# Patient Record
Sex: MALE | Race: OTHER
[De-identification: names, ages, dates, MRNs, and addresses within clinical notes are randomized per-mention and may not be internally consistent; named-entity substitution may affect disease eponyms.]

---

## 2018-11-03 ENCOUNTER — HOSPITAL ENCOUNTER (EMERGENCY)
Dept: HOSPITAL 36 - ER | Age: 48
Discharge: LEFT BEFORE DISCHARGE | End: 2018-11-03
Payer: MEDICAID

## 2018-11-03 DIAGNOSIS — E86.0: ICD-10-CM

## 2018-11-03 DIAGNOSIS — R79.89: ICD-10-CM

## 2018-11-03 DIAGNOSIS — K59.00: Primary | ICD-10-CM

## 2018-11-03 DIAGNOSIS — E87.8: ICD-10-CM

## 2018-11-03 LAB
ALBUMIN SERPL-MCNC: 4 GM/DL (ref 4.2–5.5)
ALBUMIN/GLOB SERPL: 1.1 {RATIO} (ref 1–1.8)
ALP SERPL-CCNC: 101 U/L (ref 34–104)
ALT SERPL-CCNC: 121 U/L (ref 7–52)
ANION GAP SERPL CALC-SCNC: 13.6 MMOL/L (ref 7–16)
AST SERPL-CCNC: 127 U/L (ref 13–39)
BASOPHILS # BLD AUTO: 0 TH/CUMM (ref 0–0.2)
BASOPHILS NFR BLD AUTO: 0.5 % (ref 0–2)
BILIRUB SERPL-MCNC: 1.4 MG/DL (ref 0.3–1)
BUN SERPL-MCNC: 6 MG/DL (ref 7–25)
CALCIUM SERPL-MCNC: 9.5 MG/DL (ref 8.6–10.3)
CHLORIDE SERPL-SCNC: 92 MEQ/L (ref 98–107)
CO2 SERPL-SCNC: 20.5 MEQ/L (ref 21–31)
CREAT SERPL-MCNC: 0.5 MG/DL (ref 0.7–1.3)
EOSINOPHIL # BLD AUTO: 0.1 TH/CMM (ref 0.1–0.4)
EOSINOPHIL NFR BLD AUTO: 0.8 % (ref 0–5)
ERYTHROCYTE [DISTWIDTH] IN BLOOD BY AUTOMATED COUNT: 13.2 % (ref 11.5–20)
GLOBULIN SER-MCNC: 3.6 GM/DL
GLUCOSE SERPL-MCNC: 127 MG/DL (ref 70–105)
HCT VFR BLD CALC: 46.6 % (ref 41–60)
HGB BLD-MCNC: 16.3 GM/DL (ref 12–16)
INR PPP: 1.04 (ref 0.5–1.4)
LYMPHOCYTE AB SER FC-ACNC: 1.1 TH/CMM (ref 1.5–3)
LYMPHOCYTES NFR BLD AUTO: 12.2 % (ref 20–50)
MCH RBC QN AUTO: 32.7 PG (ref 26–30)
MCHC RBC AUTO-ENTMCNC: 34.9 PG (ref 28–36)
MCV RBC AUTO: 93.7 FL (ref 80–99)
MONOCYTES # BLD AUTO: 0.8 TH/CMM (ref 0.3–1)
MONOCYTES NFR BLD AUTO: 8.5 % (ref 2–10)
NEUTROPHILS # BLD: 7.2 TH/CMM (ref 1.8–8)
NEUTROPHILS NFR BLD AUTO: 78 % (ref 40–80)
PLATELET # BLD: 190 TH/CMM (ref 150–400)
PMV BLD AUTO: 9.9 FL
POTASSIUM SERPL-SCNC: 3.1 MEQ/L (ref 3.5–5.1)
PROTHROMBIN TIME: 10.8 SECONDS (ref 9.5–11.5)
RBC # BLD AUTO: 4.98 MIL/CMM (ref 4.3–5.7)
SODIUM SERPL-SCNC: 123 MEQ/L (ref 136–145)
WBC # BLD AUTO: 9.2 TH/CMM (ref 4.8–10.8)

## 2018-11-03 NOTE — ED PHYSICIAN CHART
ED Chief Complaint/HPI





- Patient Information


Date Seen:: 11/03/18


Time Seen:: 14:07


Chief Complaint:: constipation and trauma


History of Present Illness:: 





this is a 48 yr old male who drink alcohol daily and considered an alcohol.  he 

is now concerned about his swollen abdomen and not being able to have a bowel 

movements since last night.


Allergies:: 


 Allergies











Allergy/AdvReac Type Severity Reaction Status Date / Time


 


No Known Allergies Allergy   Verified 11/03/18 14:02











Vitals:: 


 Vital Signs - 8 hr











  11/03/18





  14:03


 


Temp 98.8 F


 





 


RR 18


 


/104


 


O2 Sat % 98











Historian:: Patient


Review:: Nurse's Note Reviewed





ED Review of Systems





- Review of Systems


General/Constitutional: No fever, No chills, No weight loss, No weakness, No 

diaphoresis, No edema, No loss of appetite


Skin: No skin lesions, No rash, No bruising


Head: No headache, No light-headedness


Eyes: No loss of vision, No pain, No diplopia


ENT: No earache, No nasal drainage, No sore throat, No tinnitus


Neck: No neck pain, No swelling, No thyromegaly, No stiffness, No mass noted


Cardio Vascular: No chest pain, No palpitations, No PND, No orthopnea, No edema


Pulmonary: No SOB, No cough, No sputum, No wheezing


GI: No nausea, No vomiting, No diarrhea, Pain, No melena, No hematochezia, 

Constipation, No hematemesis


G/U: No dysuria, No frequency, No hematuria


Musculoskeletal: No bone or joint pain, No back pain, No muscle pain


Endocrine: No polyuria, No polydipsia


Psychiatric: No prior psych history, No depression, No anxiety, No suicidal 

ideation


Hematopoietic: No bruising, No lymphadenopathy


Allergic/Immuno: No urticaria, No angioedema


Neurological: No syncope, No focal symptoms, No weakness, No paresthesia, No 

headache, No seizure, No dizziness, No confusion, No vertigo





ED Past Medical History





- Past Medical History


Obtainable: Yes


Past Medical History: Other (alcohol abuse)


Family History: None


Social History: Non Smoker, Alcohol, No Drug Use, Employed


Surgical History: None


Psychiatricy History: None


Medication: Reviewed





Family Medical History





- Family Member


  ** Mother


History Unknown: Yes





ED Physical Exam





- Physical Examination


General/Constitutional: Awake, Well-developed, well-nourished, Alert, No 

distress, GCS 15, Non-toxic appearing, Ambulatory


Head: Atraumatic


Eyes: Lids, conjuctiva normal, PERRL, EOMI


Skin: Nl inspection, No rash, No skin lesions, No ecchymosis, Well hydrated, No 

lymphadenopathy


ENMT: External ears, nose nl, Nasal exam nl, Lips, teeth, gums nl


Neck: Nontender, Full ROM w/o pain, No JVD, No nuchal rigidity, No bruit, No 

mass, No stridor


Respiratory: Nl effort/Exclusion, Clear to Auscultation, No Wheeze/Rhonchi/Rales


Cardio Vascular: RRR, No murmur, gallop, rubs, NL S1 S2


GI: No tenderness/rebounding/guarding (there is right upper quadrant tenderness)

, No organomegaly, No hernia, Normal BS's, Nondistended (the abdomen is 

distended), No mass/bruits, No McBurney tenderness


: No CVA tenderness


Extremities: No tenderness or effusion, Full ROM, normal strength in all 

extremities, No edema, Normal digits & nails


Neuro/Psych: Alert/oriented, DTR's symmetric, Normal sensory exam, Normal motor 

strength, Judgement/insight normal, Mood normal, Normal gait, No focal deficits


Misc: Normal back, No paraspinal tenderness





ED Labs/Radiology/EKG Results





- Lab Results


Results: 





 Abnormal Lab Results











  11/03/18 11/03/18 11/03/18





  14:25 14:25 14:25


 


WBC   9.2 


 


RBC   4.98 


 


Hgb   16.3 


 


Hct   46.6 


 


MCV   93.7 


 


MCH   32.7 H 


 


MCHC Differential   34.9 


 


RDW   13.2 


 


Plt Count   190 


 


MPV   9.9 


 


Neutrophils %   78.0 


 


Lymphocytes %   12.2 L 


 


Monocytes %   8.5 


 


Eosinophils %   0.8 


 


Basophils %   0.5 


 


PT  10.8  


 


INR  1.04  


 


PTT (Actin FS)  26.1  


 


Sodium    123 L


 


Potassium    3.1 L


 


Chloride    92 L


 


Carbon Dioxide    20.5 L


 


Anion Gap    13.6


 


BUN    6 L


 


Creatinine    0.5 L


 


Est GFR ( Amer)    > 60.0


 


Est GFR (Non-Af Amer)    > 60.0


 


BUN/Creatinine Ratio    12.0


 


Glucose    127 H


 


Calcium    9.5


 


Total Bilirubin    1.4 H


 


AST    127 H


 


ALT    121 H


 


Alkaline Phosphatase    101


 


Ammonia   


 


Total Protein    7.6


 


Albumin    4.0 L


 


Globulin    3.6


 


Albumin/Globulin Ratio    1.1


 


Ethyl Alcohol   














  11/03/18 11/03/18





  14:25 14:25


 


WBC  


 


RBC  


 


Hgb  


 


Hct  


 


MCV  


 


MCH  


 


MCHC Differential  


 


RDW  


 


Plt Count  


 


MPV  


 


Neutrophils %  


 


Lymphocytes %  


 


Monocytes %  


 


Eosinophils %  


 


Basophils %  


 


PT  


 


INR  


 


PTT (Actin FS)  


 


Sodium  


 


Potassium  


 


Chloride  


 


Carbon Dioxide  


 


Anion Gap  


 


BUN  


 


Creatinine  


 


Est GFR ( Amer)  


 


Est GFR (Non-Af Amer)  


 


BUN/Creatinine Ratio  


 


Glucose  


 


Calcium  


 


Total Bilirubin  


 


AST  


 


ALT  


 


Alkaline Phosphatase  


 


Ammonia   57 H


 


Total Protein  


 


Albumin  


 


Globulin  


 


Albumin/Globulin Ratio  


 


Ethyl Alcohol  < 10 














- Radiology Results


Results: 





ct scan of the chest and abdomen = nad


chest x-ray = cm





- EKG Interpretations


EKG Time:: 14:08


Rate & Rhythm: rate = 110, sinus tachy


Axis: left axis





ED Assessment





- Assessment


General Assessment: 





dehydration


electrolyte imbalanc


elevated troponin





ED Septic Shock





- .


Is Septic Shock (SBP<90, OR Lactate>4 mmol\L) present?: No





- <6hrs of presentation:


Vital Signs: 


 Vital Signs - 8 hr











  11/03/18





  14:03


 


Temp 98.8 F


 





 


RR 18


 


/104


 


O2 Sat % 98














ED Reassessment (Disposition)





- Reassessment


Reassessment Condition:: Unchanged





- Diagnosis


Diagnosis:: 





elevated troponin


electrolyte imbalance


constipation





- Aftercare/Follow up Instructions


Aftercare/Follow-Up Instructions:: Counseled pt regarding lab results/diagnosis 

& need follow up, Refer to Discharge Instructions, Counseled pt & family 

regarding lab results/diagnosis & need follow up





- Patient Disposition


Discharge/Transfer:: Against Medical Advice


Condition at Disposition:: Improved

## 2018-11-04 NOTE — DIAGNOSTIC IMAGING REPORT
CT scan of the chest without intravenous contrast



HISTORY: Pain, trauma



Total DLP equals 414



CTDI equals 10.9



Axial sections were obtained from a level above the clavicles down to

level below the diaphragm.



The heart size is normal.  No abnormal mediastinal masses.  Evaluation

of the vascular hilar structures is limited due to the absence of

intravenous contrast.  No abnormal focal pulmonary parenchymal

processes.  No pleural fluid.



IMPRESSION:

1.  Limited exam due to the absence of intravenous contrast

2.  No acute abnormalities

## 2018-11-04 NOTE — DIAGNOSTIC IMAGING REPORT
CT scan of the abdomen and pelvis without intravenous contrast 



History: Pain, trauma



Total DLP equals 992



CTDI equals 17.0



Axial sections were obtained from the xiphoid process down to the pubic

symphysis.



The exam the liver demonstrates a decrease in overall parenchymal

density.  Findings are consistent with fatty infiltration and should be

correlated with liver function tests.  No focal lesions.  The spleen

appears normal. No abnormalities are seen in the region of the pancreas.

The kidneys appear normal bilaterally.



The exam of the pelvis demonstrates preservation of normal fat planes.

No abnormal soft tissue masses. No abnormal fluid collections. 



Impression:

1.  Hepatic parenchymal changes consistent with fatty infiltration.  The

findings should be correlated with liver function tests.

2.  No other acute abnormalities